# Patient Record
Sex: MALE | Race: WHITE | URBAN - METROPOLITAN AREA
[De-identification: names, ages, dates, MRNs, and addresses within clinical notes are randomized per-mention and may not be internally consistent; named-entity substitution may affect disease eponyms.]

---

## 2017-01-25 ENCOUNTER — TREATMENT (OUTPATIENT)
Dept: PHYSICAL THERAPY | Age: 52
End: 2017-01-25

## 2017-02-17 ENCOUNTER — TREATMENT (OUTPATIENT)
Dept: PHYSICAL THERAPY | Age: 52
End: 2017-02-17

## 2017-03-01 ENCOUNTER — TREATMENT (OUTPATIENT)
Dept: PHYSICAL THERAPY | Age: 52
End: 2017-03-01

## 2017-03-15 ENCOUNTER — TREATMENT (OUTPATIENT)
Dept: PHYSICAL THERAPY | Age: 52
End: 2017-03-15

## 2017-04-12 ENCOUNTER — TREATMENT (OUTPATIENT)
Dept: PHYSICAL THERAPY | Age: 52
End: 2017-04-12

## 2017-04-21 ENCOUNTER — TREATMENT (OUTPATIENT)
Dept: PHYSICAL THERAPY | Age: 52
End: 2017-04-21

## 2017-05-05 ENCOUNTER — TREATMENT (OUTPATIENT)
Dept: PHYSICAL THERAPY | Age: 52
End: 2017-05-05

## 2017-05-24 ENCOUNTER — TREATMENT (OUTPATIENT)
Dept: PHYSICAL THERAPY | Age: 52
End: 2017-05-24

## 2017-07-07 ENCOUNTER — TREATMENT (OUTPATIENT)
Dept: PHYSICAL THERAPY | Age: 52
End: 2017-07-07

## 2017-07-21 ENCOUNTER — TREATMENT (OUTPATIENT)
Dept: PHYSICAL THERAPY | Age: 52
End: 2017-07-21

## 2017-09-08 ENCOUNTER — TREATMENT (OUTPATIENT)
Dept: PHYSICAL THERAPY | Age: 52
End: 2017-09-08

## 2017-09-29 ENCOUNTER — TREATMENT (OUTPATIENT)
Dept: PHYSICAL THERAPY | Age: 52
End: 2017-09-29

## 2017-10-06 ENCOUNTER — TREATMENT (OUTPATIENT)
Dept: PHYSICAL THERAPY | Age: 52
End: 2017-10-06

## 2017-10-06 NOTE — FLOWSHEET NOTE
[address]  Phone: [phone]  Fax [phone]      Date:  10/6/2017    Patient Name:  Henrique Pool    :  1965  MRN: [id number]  Restrictions/Precautions:    Medical/Treatment Diagnosis Information:   Hist of LBP  Pilates general conditioning     Physician Information:       Patient is Post-Op [] Yes   [] No     DOS:           9/8/17 9/29/17 10/6/17       Subjective Doing well, been keeping up with this at home Been doing Pilates 2X a week, my back feels good Doing well                 Weeks Post-Op                    Objective Ab strength improvement  Noted, hamstring flexibility remaining the same, les pain complaints Trunk stab skills improving, spine ext 45 degrees on box, ham flexibility 90 degrees     Ab strength improvement  Noted, hamstring flexibility remaining the same, less pain complaints             Goals Cont Pilates trunk stab for maintenance      Cont Pilates trunk stab for maintenance             Reformer Exercises 2R1G squats, raises w/ str, U sq w/ isidro, walks   2R1G squats, raises w/ str, U sq w/ isidro, walks 2G1R squats, raises w/ str, U sq w/ isidro, walks 2G1R squats, raises w/ str, U sq w/ isidro, walks 2G1R squats, raises w/ str, U sq w/ isidro, walks 2G1R squats, raises w/ str, U sq w/ isidro, walks 2G1R squats, raises w/ str, U sq w/ isidro, walks   Pelvic Stabilization   2R stand ab/ad 20/20 1R1B stand  1R1B stand 1R1B stand 3X10 1R1B stand 3X10 1R1B stand 3X10                                  Trunk Stabilization 2R TA variat  2R 100's, obl 2R TA variat  2R 100's, obl 2R TA variat  2R 100's, obl 2R TA variat  2R 100's, obl 2R TA variat  2R 100's, obl 2R TA variat  2R 100's, obl 2R TA variat  2R 100's, obl              1R1B Plank 1min 15  1 min 45sec Plank 1 min Plank 1min5 sec      2R dbl leg press, plank         Box: HSC 2R  Box: passive ext     2R HSC  Passive ext 2R HSC  1R Passive spine ext w/hip ext 2R HSC  1R Passive spine ext w/hip ext      Hip Disassociation 2R1B arcs: ll, v, cirlce, ir/er, U w/ abd, frog 2R1B arcs: ll, v, Jamul, ir/er, U w/ abd, frog 2R1B arcs: ll, v, [place], ir/er, U w/ abd, frog 2R1B arcs: ll, v, [place], ir/er, U w/ abd, frog 2R1B arcs: ll, v, [place], ir/er, U w/ abd, frog 2R1B arcs: ll, v, [place], ir/er, U w/ abd, frog 1R1B arcs: ll, v, [place], ir/er, U w/ abd, frog              2R stand leg press 2R stand leg press 1 arm 2R stand leg press 1 arm 2R stand leg press 1 arm 2R stand leg press 1 arm 2R stand leg press 1 arm 2R stand leg press 1 arm             Scapular Stabilization Seated: 2R1B low row, 2R biceps, 1R shldr ext                                       Thoracic Mobility 2R1B Bridge w ext 2R1B Bridge w ext 2R Bridge 2R Bridge w/ext 2R Bridge w/ext 2R Bridge w/ext 2R Bridge w/ext                                 General ROM Hams, hip add, pirif, lunge Hams, hip add, pirif, lunge Hams, hip add, pirif, lunge Hams, hip add, pirif, lunge Hams hip add pirif lunge Hams hip add pirif lunge Hams hip add pirif lunge                                 Other                                        Summary/Comments                                           Electronically signed by:  Jhoana Jolly

## 2017-11-03 ENCOUNTER — TREATMENT (OUTPATIENT)
Dept: PHYSICAL THERAPY | Age: 52
End: 2017-11-03

## 2017-11-17 ENCOUNTER — TREATMENT (OUTPATIENT)
Dept: PHYSICAL THERAPY | Age: 52
End: 2017-11-17

## 2017-11-17 NOTE — FLOWSHEET NOTE
11/3/17  [address]  Phone: [phone]  Fax [phone]      Date:  2017    Patient Name:  Dain Cazares    :  1965  MRN: [id number]  Restrictions/Precautions:    Medical/Treatment Diagnosis Information:   Hist of LBP  Pilates general conditioning     Physician Information:       Patient is Post-Op [] Yes   [] No     DOS:           9/8/17 9/29/17 10/6/17 11/3/17 11/17/17     Subjective Doing well, been keeping up with this at home Been doing Pilates 2X a week, my back feels good Doing well Back is feeling good, I have been doing my home exercises almost everyday and Pilates on the [place] wo or three times a week.  Doing well               Weeks Post-Op                    Objective Ab strength improvement  Noted, hamstring flexibility remaining the same, les pain complaints Trunk stab skills improving, spine ext 45 degrees on box, ham flexibility 90 degrees  Trunk stab skills improving,  spine ext 45 degrees on box,   ham flexibility 90 degrees   Ab strength improvement  Noted, hamstring flexibility remaining the same, less pain complaints             Goals Cont Pilates trunk stab for maintenance      Cont Pilates trunk stab for maintenance             Reformer Exercises 2R1G squats, raises w/ str, U sq w/ isidro, walks   2R1G squats, raises w/ str, U sq w/ isidro, walks 2G1R squats, raises w/ str, U sq w/ isidro, walks 2G1R squats, raises w/ str, U sq w/ isidro, walks 2G2R squats, raises w/ str, U sq w/ isidro, walks 2G1R squats, raises w/ str, U sq w/ isidro, walks 2G1R squats, raises w/ str, U sq w/ isidro, walks   Pelvic Stabilization   2R stand ab/ad 20/20 1R1B stand  1R1B stand 1R1B stand 3X10 1R1B stand 3X10 1R1B stand 3X10                                  Trunk Stabilization 2R TA variat  2R 100's, obl 2R TA variat  2R 100's, obl 2R TA variat  2R 100's, obl 2R TA variat  2R 100's, obl 2R TA variat  2R 100's, obl 2R TA variat  2R 100's, obl 2R TA variat  2R 100's, obl              1R1B Plank 1min 15  1 min 45sec Plank 1 min Plank 1min5 sec      2R dbl leg press, plank         Box: HSC 2R  Box: passive ext     2R HSC  Passive ext 2R HSC  1R Passive spine ext w/hip ext 2R HSC  1R Passive spine ext w/hip ext      Hip Disassociation 2R1B arcs: ll, v, cirlce, ir/er, U w/ abd, frog 2R1B arcs: ll, v, [place], ir/er, U w/ abd, frog 2R1B arcs: ll, v, [place], ir/er, U w/ abd, frog 2R1B arcs: ll, v, [place], ir/er, U w/ abd, frog 2R1B arcs: ll, v, [place], ir/er, U w/ abd, frog 2R1B arcs: ll, v, [place], ir/er, U w/ abd, frog 1R1B arcs: ll, v, [place], ir/er, U w/ abd, frog              2R stand leg press 2R stand leg press 1 arm 2R stand leg press 1 arm 2R stand leg press 1 arm 2R stand leg press 1 arm 2R stand leg press 1 arm 2R stand leg press 1 arm             Scapular Stabilization Seated: 2R1B low row, 2R biceps, 1R shldr ext   Seated: 2R1B low row, 2R biceps, 1R shldr ext                                    Thoracic Mobility 2R1B Bridge w ext 2R1B Bridge w ext 2R Bridge 2R Bridge w/ext 2R Bridge w/ext 2R Bridge w/ext 2R Bridge w/ext                                 General ROM Hams, hip add, pirif, lunge Hams, hip add, pirif, lunge Hams, hip add, pirif, lunge Hams, hip add, pirif, lunge Hams hip add pirif lunge Hams hip add pirif lunge Hams hip add pirif lunge                                 Other                                        Summary/Comments                                           Electronically signed by:  Celeste Boeck

## 2018-01-26 ENCOUNTER — TREATMENT (OUTPATIENT)
Dept: PHYSICAL THERAPY | Age: 53
End: 2018-01-26

## 2018-01-26 NOTE — FLOWSHEET NOTE
2R TA variat  2R 100's, obl 2R TA variat  2R 100's, obl 2R TA variat  2R 100's, obl              1R1B Plank 1min 15  1 min 45sec Plank 1 min Plank 1min5 sec      2R dbl leg press, plank         Box: HSC 2R  Box: passive ext     2R HSC  Passive ext 2R HSC  1R Passive spine ext w/hip ext 2R HSC  1R Passive spine ext w/hip ext      Hip Disassociation 2R1B arcs: ll, v, cirlce, ir/er, U w/ abd, frog 2R1B arcs: ll, v, [place], ir/er, U w/ abd, frog 2R1B arcs: ll, v, [place], ir/er, U w/ abd, frog 2R1B arcs: ll, v, [place], ir/er, U w/ abd, frog 2R1B arcs: ll, v, [place], ir/er, U w/ abd, frog 2R1B arcs: ll, v, [place], ir/er, U w/ abd, frog 1R1B arcs: ll, v, [place], ir/er, U w/ abd, frog              2R stand leg press 2R stand leg press 1 arm 2R stand leg press 1 arm 2R stand leg press 1 arm 2R stand leg press 1 arm 2R stand leg press 1 arm 2R stand leg press 1 arm             Scapular Stabilization Seated: 2R1B low row, 2R biceps, 1R shldr ext   Seated: 2R1B low row, 2R biceps, 1R shldr ext                                    Thoracic Mobility 2R1B Bridge w ext 2R1B Bridge w ext 2R Bridge 2R Bridge w/ext 2R Bridge w/ext 2R Bridge w/ext 2R Bridge w/ext                                 General ROM Hams, hip add, pirif, lunge Hams, hip add, pirif, lunge Hams, hip add, pirif, lunge Hams, hip add, pirif, lunge Hams hip add pirif lunge Hams hip add pirif lunge Hams hip add pirif lunge                                 Other                                        Summary/Comments                                           Electronically signed by:  Ana Smith

## 2018-05-10 ENCOUNTER — EVALUATION (OUTPATIENT)
Dept: PHYSICAL THERAPY | Age: 53
End: 2018-05-10

## 2018-05-10 DIAGNOSIS — M54.12 CERVICAL RADICULOPATHY: Primary | ICD-10-CM

## 2018-05-10 PROCEDURE — 97161 PT EVAL LOW COMPLEX 20 MIN: CPT | Performed by: PHYSICAL THERAPIST

## 2018-05-10 PROCEDURE — 97110 THERAPEUTIC EXERCISES: CPT | Performed by: PHYSICAL THERAPIST

## 2018-05-10 PROCEDURE — 97140 MANUAL THERAPY 1/> REGIONS: CPT | Performed by: PHYSICAL THERAPIST

## 2018-05-15 ENCOUNTER — TREATMENT (OUTPATIENT)
Dept: PHYSICAL THERAPY | Age: 53
End: 2018-05-15

## 2018-05-15 DIAGNOSIS — M54.12 CERVICAL RADICULOPATHY: Primary | ICD-10-CM

## 2018-05-15 PROCEDURE — 97110 THERAPEUTIC EXERCISES: CPT | Performed by: PHYSICAL THERAPIST

## 2018-05-15 PROCEDURE — 97140 MANUAL THERAPY 1/> REGIONS: CPT | Performed by: PHYSICAL THERAPIST

## 2018-05-17 ENCOUNTER — TREATMENT (OUTPATIENT)
Dept: PHYSICAL THERAPY | Age: 53
End: 2018-05-17

## 2018-05-17 DIAGNOSIS — M54.12 CERVICAL RADICULOPATHY: Primary | ICD-10-CM

## 2018-05-17 PROCEDURE — 97140 MANUAL THERAPY 1/> REGIONS: CPT | Performed by: PHYSICAL THERAPIST

## 2018-05-17 PROCEDURE — 97110 THERAPEUTIC EXERCISES: CPT | Performed by: PHYSICAL THERAPIST

## 2018-05-22 ENCOUNTER — TREATMENT (OUTPATIENT)
Dept: PHYSICAL THERAPY | Age: 53
End: 2018-05-22

## 2018-05-22 DIAGNOSIS — M54.12 CERVICAL RADICULOPATHY: Primary | ICD-10-CM

## 2018-05-22 PROCEDURE — 97110 THERAPEUTIC EXERCISES: CPT | Performed by: PHYSICAL THERAPIST

## 2018-05-22 PROCEDURE — 97140 MANUAL THERAPY 1/> REGIONS: CPT | Performed by: PHYSICAL THERAPIST

## 2018-05-29 ENCOUNTER — TREATMENT (OUTPATIENT)
Dept: PHYSICAL THERAPY | Age: 53
End: 2018-05-29

## 2018-05-29 DIAGNOSIS — M54.12 CERVICAL RADICULOPATHY: Primary | ICD-10-CM

## 2018-05-29 PROCEDURE — 97110 THERAPEUTIC EXERCISES: CPT | Performed by: PHYSICAL THERAPIST

## 2018-05-29 PROCEDURE — 97140 MANUAL THERAPY 1/> REGIONS: CPT | Performed by: PHYSICAL THERAPIST

## 2018-06-01 ENCOUNTER — HOSPITAL ENCOUNTER (OUTPATIENT)
Dept: PHYSICAL THERAPY | Age: 53
Discharge: HOME | End: 2018-06-30
Attending: ORTHOPAEDIC SURGERY | Admitting: ORTHOPAEDIC SURGERY

## 2018-06-01 NOTE — FLOWSHEET NOTE
driving/computer work  [] (07260) Reviewed/Progressed HEP activities related to improving balance, coordination, kinesthetic sense, posture, motor skill, proprioception of cervical, scapular, scapulothoracic and UE control with self care, reaching, carrying, lifting, house/yardwork, driving/computer work      Manual Treatments:  PROM / STM / Oscillations-Mobs:  G-I, II, III, IV (PA's, Inf., Post.)  [x] (12522) Provided manual therapy to mobilize soft tissue/joints of cervical/CT, scapular GHJ and UE for the purpose of decreasing headache, modulating pain, promoting relaxation,  increasing ROM, reducing/eliminating soft tissue swelling/inflammation/restriction, improving soft tissue extensibility and allowing for proper ROM for normal function with self care, reaching, carrying, lifting, house/yardwork, driving/computer work    Modalities: Mechanical traction x12' 45\"/15\" 20#/14#;  MHP x10' cervical spine    Charges:  Timed Code Treatment Minutes: 35   Total Treatment Minutes: 57     [] EVAL (LOW) 46473 (typically 20 minutes face-to-face)  [] EVAL (MOD) 35361 (typically 30 minutes face-to-face)  [] EVAL (HIGH) 16394 (typically 45 minutes face-to-face)  [] RE-EVAL     [x] PD(72181) x  1   [] IONTO  [] NMR (67075) x      [] VASO   [x] Manual (40437) x  1    [] Other:  [] TA x       [x] Mech Traction (78888)  [] ES(attended) (32450)      [] ES (un) (33514):     GOALS:  Short Term Goals: To be achieved in: 2 weeks  1. Independent in HEP and progression per patient tolerance, in order to prevent re-injury. 2. Patient will have a decrease in pain to facilitate improvement in movement, function, and ADLs as indicated by Functional Deficits.     Long Term Goals: To be achieved in: 8 weeks  1. Disability index score of 10% or less for the NDI to assist with reaching prior level of function.    2. Patient will demonstrate increased AROM to [facility] of cervical/thoracic spine to allow for proper joint functioning as indicated by patients Functional Deficits. 3. Patient will demonstrate an increase in postural awareness and control and activation of  Deep cervical stabilizers to allow for proper functional mobility as indicated by patients Functional Deficits. 4. Patient will return to Pilates and cycling/functional activities without increased symptoms or restriction. 5. Pt will no longer report radiculopathy in R UE during work(patient specific functional goal)       Progression Towards Functional goals:  [x] Patient is progressing as expected towards functional goals listed. [] Progression is slowed due to complexities listed. [] Progression has been slowed due to co-morbidities. [] Plan just implemented, too soon to assess goals progression  [] Other:     ASSESSMENT:  Initiated [facility] traction today with positive effects - reduced N/T R UE. Discussed activity modification during upcoming travel/hiking. Also discussed possible use of home traction unit. Improved awareness of posture noted throughout treatment.     Treatment/Activity Tolerance:  [x] Patient tolerated treatment well [] Patient limited by fatique  [] Patient limited by pain  [] Patient limited by other medical complications  [] Other:     Prognosis: [x] Good [] Fair  [] Poor    Patient Requires Follow-up: [x] Yes  [] No    PLAN: See eval  [x] Continue per plan of care [] Alter current plan (see comments)  [] Plan of care initiated [] Hold pending MD visit [] Discharge    Electronically signed by: Mery Goldstein, [address] [facility], DPT [id number]

## 2018-07-01 ENCOUNTER — HOSPITAL ENCOUNTER (OUTPATIENT)
Dept: PHYSICAL THERAPY | Age: 53
Discharge: HOME | End: 2018-07-01
Attending: ORTHOPAEDIC SURGERY | Admitting: ORTHOPAEDIC SURGERY

## 2018-07-16 ENCOUNTER — TREATMENT (OUTPATIENT)
Dept: PHYSICAL THERAPY | Age: 53
End: 2018-07-16

## 2018-07-16 DIAGNOSIS — M54.12 CERVICAL RADICULOPATHY: Primary | ICD-10-CM

## 2018-07-16 PROCEDURE — 97110 THERAPEUTIC EXERCISES: CPT | Performed by: PHYSICAL THERAPIST

## 2018-07-16 PROCEDURE — 97140 MANUAL THERAPY 1/> REGIONS: CPT | Performed by: PHYSICAL THERAPIST

## 2018-07-16 NOTE — FLOWSHEET NOTE
[facility] [place]      Physical Therapy Daily Treatment Note  Date:  2018    Patient Name:  Margie Granado    :  1965  MRN: [id number]  Restrictions/Precautions:    Medical/Treatment Diagnosis Information:  Diagnosis: Cervicalgia M54.2  Treatment Diagnosis: Cervicalgia M54.2; Cervical radiculopathy Y65.41  Insurance/Certification information:  PT Insurance Information: BCBS  Physician Information:  Referring Practitioner: Marc Duverney, MD  Plan of care signed (Y/N):     Date of Patient follow up with Physician:     G-Code (if applicable):      Date G-Code Applied:    PT G-Codes  Functional Assessment Tool Used: NDI  Score: 20%  Functional Limitation: Carrying, moving and handling objects  Carrying, Moving and Handling Objects Current Status (): At least 20 percent but less than 40 percent impaired, limited or restricted  Carrying, Moving and Handling Objects Goal Status (): At least 1 percent but less than 20 percent impaired, limited or restricted    Progress Note: [x]  Yes  []  No  Next due by: Visit #10      Latex Allergy:  [x]NO      []YES  Preferred Language for Healthcare:   [x]English       []other:    Visit # Insurance Allowable Requires auth   7 30    []no        []yes:     Pain level:  NT/10     SUBJECTIVE: Feeling a bit better. Pt reports that he doesn't get N/T in R UE as often but still daily. He has been doing HEP once/day. Just got home traction unit and he has questions about how to use it.     OBJECTIVE:  Observation: pt ed: use of home Bass traction unit x10'; recommended he begin with 10' treatment once or twice/day  Reviewed ulnar nerve glides and HEP with black TB given      Test measurements:  NT    RESTRICTIONS/PRECAUTIONS: R UE radic    Exercises/Interventions:   Therapeutic Ex Sets/sec Reps Notes   Chin tuck  hep   scap squeeze  hep   UT S  hep   LS S  hep   [place] Pec S 5x20\"  hep   No money - Green TB 2x10x5\"  Added to hep Deficits. 3. Patient will demonstrate an increase in postural awareness and control and activation of  Deep cervical stabilizers to allow for proper functional mobility as indicated by patients Functional Deficits. 4. Patient will return to Pilates and cycling/functional activities without increased symptoms or restriction. 5. Pt will no longer report radiculopathy in R UE during work(patient specific functional goal)       Progression Towards Functional goals:  [x] Patient is progressing as expected towards functional goals listed. [] Progression is slowed due to complexities listed. [] Progression has been slowed due to co-morbidities. [] Plan just implemented, too soon to assess goals progression  [] Other:     ASSESSMENT:  Educated pt on use of home traction unit and reviewed HEP, progressed TB resistance. Recommended pt cont to perform HEP daily, add in daily home traction and f/u prn as needed. Tightness in R cervical/thoracic musculature noted compared to L.     Treatment/Activity Tolerance:  [x] Patient tolerated treatment well [] Patient limited by fatique  [] Patient limited by pain  [] Patient limited by other medical complications  [] Other:     Prognosis: [x] Good [] Fair  [] Poor    Patient Requires Follow-up: [x] Yes  [] No    PLAN: See eval  [] Continue per plan of care [x] Alter current plan (see comments)  [] Plan of care initiated [] Hold pending MD visit [] Discharge    Electronically signed by: Annalise Peraza, [address] [facility], DPT [id number]

## 2018-11-14 ENCOUNTER — TREATMENT (OUTPATIENT)
Dept: PHYSICAL THERAPY | Age: 53
End: 2018-11-14

## 2018-11-14 NOTE — FLOWSHEET NOTE
11/3/17  [address]  Phone: [phone]  Fax [phone]      Date:  2018    Patient Name:  Josue Carvajal    :  1965  MRN: [id number]  Restrictions/Precautions:    Medical/Treatment Diagnosis Information:   Hist of LBP  Pilates general conditioning     Physician Information:       Patient is Post-Op [] Yes   [] No     DOS:           9/8/17 9/29/17 10/6/17 11/3/17 11/17/17 1/26/18 11/14/18   Subjective Doing well, been keeping up with this at home Been doing Pilates 2X a week, my back feels good Doing well Back is feeling good, I have been doing my home exercises almost everyday and Pilates on the [place] wo or three times a week.  Doing well Feel good been doing Pilates at home I'm back from having a disc problem in my neck and feeling much better             Weeks Post-Op                    Objective Ab strength improvement  Noted, hamstring flexibility remaining the same, les pain complaints Trunk stab skills improving, spine ext 45 degrees on box, ham flexibility 90 degrees  Trunk stab skills improving,  spine ext 45 degrees on box,   ham flexibility 90 degrees  Hamstrings -4 No pain  Good shoulder ROM  Hams 90 Degrees B             Goals Cont Pilates trunk stab for maintenance      Cont Pilates trunk stab for maintenance             Reformer Exercises 2R1G squats, raises w/ str, U sq w/ isidro, walks   2R1G squats, raises w/ str, U sq w/ isidro, walks 2G1R squats, raises w/ str, U sq w/ isidro, walks 2G1R squats, raises w/ str, U sq w/ isidro, walks 2G2R squats, raises w/ str, U sq w/ isidro, walks 2G1R squats, raises w/ str, U sq w/ isidro, walks 2G1R squats, raises w/ str, U sq w/ isidro, walks   Pelvic Stabilization   2R stand ab/ad 20/20 1R1B stand  1R1B stand 1R1B stand 3X10 1R1B stand 3X10 1R1B stand 3X10 1R1B stand duke                                 Trunk Stabilization 2R TA variat  2R 100's, obl 2R TA variat  2R 100's, obl 2R TA

## 2018-12-12 ENCOUNTER — TREATMENT (OUTPATIENT)
Dept: PHYSICAL THERAPY | Age: 53
End: 2018-12-12

## 2018-12-12 NOTE — FLOWSHEET NOTE
11/3/17  [address]  Phone: [phone]  Fax [phone]      Date:  2018    Patient Name:  Yolanda Rosenberg    :  1965  MRN: [id number]  Restrictions/Precautions:    Medical/Treatment Diagnosis Information:   Hist of LBP  Pilates general conditioning     Physician Information:       Patient is Post-Op [] Yes   [] No     DOS:           18         Subjective Doing really well, been doing Reformer exercises twice a week doing home exercises 2-3 days a week                   Weeks Post-Op                    Objective Ab strength improvement  Noted, hamstring flexibility remaining the same, less pain complaints Trunk stab skills improving, spine ext 45 degrees on box, ham flexibility 90 degrees  Trunk stab skills improving,  spine ext 45 degrees on box,   ham flexibility 90 degrees  Hamstrings -4 No pain  Good shoulder ROM  Hams 90 Degrees B             Goals Cont Pilates trunk stab for maintenance  Posture  Neck strength      Cont Pilates trunk stab for maintenance             Reformer Exercises 2R1G squats, raises w/ str, U sq w/ isidro, walks   2R1G squats, raises w/ str, U sq w/ isidro, walks 2G1R squats, raises w/ str, U sq w/ isidro, walks 2G1R squats, raises w/ str, U sq w/ isidro, walks 2G2R squats, raises w/ str, U sq w/ isidro, walks 2G1R squats, raises w/ str, U sq w/ isidro, walks 2G1R squats, raises w/ str, U sq w/ isidro, walks   Pelvic Stabilization   2R stand ab/ad 20/20 1R1B stand  1R1B stand 1R1B stand 3X10 1R1B stand 3X10 1R1B stand 3X10 1R1B stand duke                                 Trunk Stabilization 2R TA variat  2R 100's, obl 2R TA variat  2R 100's, obl 2R TA variat  2R 100's, obl 2R TA variat  2R 100's, obl 2R TA variat  2R 100's, obl 2R TA variat  2R 100's, obl 1R1B TA variat  1R1B modified 100's, obl              1R1B Plank 1min 15  1 min 45sec Plank 1 min Plank 1min5 sec      2R dbl leg press, plank         Box: [address]

## 2018-12-21 ENCOUNTER — TREATMENT (OUTPATIENT)
Dept: PHYSICAL THERAPY | Age: 53
End: 2018-12-21

## 2018-12-21 NOTE — FLOWSHEET NOTE
1min5 sec      2R dbl leg press, plank         Box: HSC 2R  Box: passive ext     2R HSC  Passive ext 2R HSC  1R Passive spine ext w/hip ext 1R1B HSC  1R Passive spine ext w/hip ext  1R prone thor ext w/ scap squeeze      Hip Disassociation 2R1B arcs: ll, v, cirlce, ir/er, U w/ abd, frog 2R1B arcs: ll, v, [place], ir/er, U w/ abd, frog 2R1B arcs: ll, v, [place], ir/er, U w/ abd, frog 2R1B arcs: ll, v, [place], ir/er, U w/ abd, frog 2R1B arcs: ll, v, [place], ir/er, U w/ abd, frog 2R1B arcs: ll, v, [place], ir/er, U w/ abd, frog 2R1b arcs: ll, v, [place], ir/er, U w/ abd, frog              2R stand leg press 2R stand leg press 1 arm 2R stand leg press 1 arm 2R stand leg press 1 arm 2R stand leg press 1 arm 2R stand leg press 1 arm              Scapular Stabilization Seated: 2R1B low row, 2R biceps, 1R shldr ext   Seated: 2R1B low row, 2R biceps, 1R shldr ext   Seated on box: low row, shldr ext              Box: prone- chin tuck, scap squeeze                   Thoracic Mobility 2R1B Bridge w ext 2R1B Bridge w ext 2R Bridge 2R Bridge w/ext 2R Bridge w/ext 2R Bridge w/ext 2R Bridge w/ext 2:1                                 General ROM Hams, hip add, pirif, lunge Hams, hip add, pirif, lunge Hams, hip add, pirif, lunge Hams, hip add, pirif, lunge Hams hip add pirif lunge Hams hip add pirif lunge Hams hip add pirif lunge                                 Other                                        Summary/Comments                                           Electronically signed by:  Justin Perez

## 2019-02-13 ENCOUNTER — TREATMENT (OUTPATIENT)
Dept: PHYSICAL THERAPY | Age: 54
End: 2019-02-13

## 2019-04-19 ENCOUNTER — TREATMENT (OUTPATIENT)
Dept: PHYSICAL THERAPY | Age: 54
End: 2019-04-19

## 2019-04-19 PROCEDURE — MISCPILATES PILATES CLASS

## 2019-04-19 NOTE — FLOWSHEET NOTE
11/3/17  [address]  Phone: [phone]  Fax [phone]      Date:  2019    Patient Name:  Bevely Dancer    :  1965  MRN: [id number]  Restrictions/Precautions:    Medical/Treatment Diagnosis Information:   Hist of LBP  Pilates general conditioning     Physician Information:       Patient is Post-Op [] Yes   [] No     DOS:           18      Subjective Doing really well, been doing Reformer exercises twice a week doing home exercises 2-3 days a week Feeling good, my neck and back have been doing well Took a yoga class and my back and hamstrings were pretty sore, I will avoid so much forward flexion next time Feeling real good, just want you to look at my routine         [address] 19      Weeks Post-Op                    Objective Ab strength improvement  Noted, hamstring flexibility remaining the same, less pain complaints Trunk stab skills improving, spine ext 45 degrees on box, ham flexibility 90 degrees No pain  Good shoulder ROM  Hams 90 Degrees B Trunk stab skills improving,  spine ext 45 degrees on box,   ham flexibility 90 degrees  Hamstrings -4 No pain  Good shoulder ROM  Hams 90 Degrees B             Goals Cont Pilates trunk stab for maintenance  Posture  Neck strength      Cont Pilates trunk stab for maintenance             Reformer Exercises 2R1G squats, raises w/ str, U sq w/ isidro, walks   2R1G squats, raises w/ str, U sq w/ isidro, walks 2G1R squats, raises w/ str, U sq w/ isidro, walks 2G1R squats, raises w/ str, U sq w/ isidro, walks 2G2R squats, raises w/ str, U sq w/ isidro, walks 2G1R squats, raises w/ str, U sq w/ isidro, walks 2G1R squats, raises w/ str, U sq w/ isidro, walks   Pelvic Stabilization   2R stand ab/ad 20/20 1R1B stand  2R stand 1R1B stand 3X10 1R1B stand 3X10 1R1B stand 3X10 1R1B stand duke                                 Trunk Stabilization 2R TA variat  2R 100's, obl 2R TA variat  2R 100's, obl 2R TA variat  2R 100's, obl 2R TA variat  2R 100's, obl 2R TA variat  2R 100's, obl 2R TA variat  2R 100's, obl 1R1B TA variat  1R1B modified 100's, obl              1R1B Plank 1min 15  1 min 1 min Plank 1 min Plank 1min5 sec      2R dbl leg press, plank    Box: HSC 2R  Box: passive ext     Box: HSC 2R  Box: passive ext     2R HSC  Passive ext 2R HSC  1R Passive spine ext w/hip ext 1R1B HSC  1R Passive spine ext w/hip ext  1R prone thor ext w/ scap squeeze      Hip Disassociation 2R1B arcs: ll, v, cirlce, ir/er, U w/ abd, frog 2R1B arcs: ll, v, [place], ir/er, U w/ abd, frog 2R1B arcs: ll, v, [place], ir/er, U w/ abd, frog 2R1B arcs: ll, v, [place], ir/er, U w/ abd, frog 2R1B arcs: ll, v, [place], ir/er, U w/ abd, frog 2R1B arcs: ll, v, [place], ir/er, U w/ abd, frog 2R1b arcs: ll, v, [place], ir/er, U w/ abd, frog              2R stand leg press 2R stand leg press 1 arm  2R stand leg press 1 arm 2R stand leg press 1 arm 2R stand leg press 1 arm              Scapular Stabilization Seated: 2R1B low row, 2R biceps, 1R shldr ext  Seated: low row 2R  shldr ext 1R  Biceps 1R1B Seated: 2R1B low row, 2R biceps, 1R shldr ext   Seated on box: low row, shldr ext              Box: prone- chin tuck, scap squeeze                   Thoracic Mobility 2R1B Bridge w ext 2R1B Bridge w ext 2R Bridge w/ext 2R Bridge w/ext 2R Bridge w/ext 2R Bridge w/ext 2R Bridge w/ext 2:1                                 General ROM Hams, hip add, pirif, lunge Hams, hip add, pirif, lunge Hams, hip add, pirif, lunge Hams, hip add, pirif, lunge Hams hip add pirif lunge Hams hip add pirif lunge Hams hip add pirif lunge                                 Other                                        Summary/Comments                                           Electronically signed by:  Delores Pleitez

## 2019-04-19 NOTE — FLOWSHEET NOTE
11/3/17  [address]  Phone: [phone]  Fax [phone]      Date:  2019    Patient Name:  Hien Rodriguez    :  1965  MRN: [id number]  Restrictions/Precautions:    Medical/Treatment Diagnosis Information:   Hist of LBP  Pilates general conditioning     Physician Information:       Patient is Post-Op [] Yes   [] No     DOS:           18      Subjective Doing really well, been doing Reformer exercises twice a week doing home exercises 2-3 days a week Feeling good, my neck and back have been doing well Took a yoga class and my back and hamstrings were pretty sore, I will avoid so much forward flexion next time Feeling real good, just want you to look at my routine                Weeks Post-Op                    Objective Ab strength improvement  Noted, hamstring flexibility remaining the same, less pain complaints Trunk stab skills improving, spine ext 45 degrees on box, ham flexibility 90 degrees No pain  Good shoulder ROM  Hams 90 Degrees B Trunk stab skills improving,  spine ext 45 degrees on box,   ham flexibility 90 degrees  Hamstrings -4 No pain  Good shoulder ROM  Hams 90 Degrees B             Goals Cont Pilates trunk stab for maintenance  Posture  Neck strength      Cont Pilates trunk stab for maintenance             Reformer Exercises 2R1G squats, raises w/ str, U sq w/ isidro, walks   2R1G squats, raises w/ str, U sq w/ isidro, walks 2G1R squats, raises w/ str, U sq w/ isidro, walks 2G1R squats, raises w/ str, U sq w/ isidro, walks 2G2R squats, raises w/ str, U sq w/ isidro, walks 2G1R squats, raises w/ str, U sq w/ isidro, walks 2G1R squats, raises w/ str, U sq w/ isidro, walks   Pelvic Stabilization   2R stand ab/ad 20/20 1R1B stand  2R stand 1R1B stand 3X10 1R1B stand 3X10 1R1B stand 3X10 1R1B stand duke                                 Trunk Stabilization 2R TA variat  2R 100's, obl 2R TA variat  2R 100's, obl 2R TA variat  2R 100's, obl 2R TA variat  2R 100's, obl 2R TA variat  2R 100's, obl 2R TA variat  2R 100's, obl 1R1B TA variat  1R1B modified 100's, obl              1R1B Plank 1min 15  1 min 1 min Plank 1 min Plank 1min5 sec      2R dbl leg press, plank    Box: HSC 2R  Box: passive ext     Box: HSC 2R  Box: passive ext     2R HSC  Passive ext 2R HSC  1R Passive spine ext w/hip ext 1R1B HSC  1R Passive spine ext w/hip ext  1R prone thor ext w/ scap squeeze      Hip Disassociation 2R1B arcs: ll, v, cirlce, ir/er, U w/ abd, frog 2R1B arcs: ll, v, [place], ir/er, U w/ abd, frog 2R1B arcs: ll, v, [place], ir/er, U w/ abd, frog 2R1B arcs: ll, v, [place], ir/er, U w/ abd, frog 2R1B arcs: ll, v, [place], ir/er, U w/ abd, frog 2R1B arcs: ll, v, [place], ir/er, U w/ abd, frog 2R1b arcs: ll, v, [place], ir/er, U w/ abd, frog              2R stand leg press 2R stand leg press 1 arm  2R stand leg press 1 arm 2R stand leg press 1 arm 2R stand leg press 1 arm              Scapular Stabilization Seated: 2R1B low row, 2R biceps, 1R shldr ext  Seated: low row 2R  shldr ext 1R  Biceps 1R1B Seated: 2R1B low row, 2R biceps, 1R shldr ext   Seated on box: low row, shldr ext              Box: prone- chin tuck, scap squeeze                   Thoracic Mobility 2R1B Bridge w ext 2R1B Bridge w ext 2R Bridge w/ext 2R Bridge w/ext 2R Bridge w/ext 2R Bridge w/ext 2R Bridge w/ext 2:1                                 General ROM Hams, hip add, pirif, lunge Hams, hip add, pirif, lunge Hams, hip add, pirif, lunge Hams, hip add, pirif, lunge Hams hip add pirif lunge Hams hip add pirif lunge Hams hip add pirif lunge                                 Other                                        Summary/Comments                                           Electronically signed by:  Javier Benton

## 2019-08-02 ENCOUNTER — TREATMENT (OUTPATIENT)
Dept: PHYSICAL THERAPY | Age: 54
End: 2019-08-02

## 2019-08-02 PROCEDURE — MISCPILATES PILATES CLASS

## 2019-08-02 NOTE — FLOWSHEET NOTE
11/3/17  [address]  Phone: [phone]  Fax [phone]      Date:  2019    Patient Name:  Marsha Vargas    :  1965  MRN: [id number]  Restrictions/Precautions:    Medical/Treatment Diagnosis Information:   Hist of LBP  Pilates general conditioning     Physician Information:       Patient is Post-Op [] Yes   [] No     DOS:           18     Subjective Doing really well, been doing Reformer exercises twice a week doing home exercises 2-3 days a week Feeling good, my neck and back have been doing well Took a yoga class and my back and hamstrings were pretty sore, I will avoid so much forward flexion next time Feeling real good, just want you to look at my routine My neck has been a little sore but I have been working a lot         P[address] 210 19 2      Weeks Post-Op                    Objective Ab strength improvement  Noted, hamstring flexibility remaining the same, less pain complaints Trunk stab skills improving, spine ext 45 degrees on box, ham flexibility 90 degrees No pain  Good shoulder ROM  Hams 90 Degrees B Trunk stab skills improving,  spine ext 45 degrees on box,   ham flexibility 90 degrees  Hamstrings -4 No pain  Good shoulder ROM  Hams 90 Degrees B             Goals Cont Pilates trunk stab for maintenance  Posture  Neck strength      Cont Pilates trunk stab for maintenance             Reformer Exercises 2R1G squats, raises w/ str, U sq w/ isidro, walks   2R1G squats, raises w/ str, U sq w/ isidro, walks 2G1R squats, raises w/ str, U sq w/ isidro, walks 2G1R squats, raises w/ str, U sq w/ isidro, walks 2G2R squats, raises w/ str, U sq w/ isidro, walks 2G1R squats, raises w/ str, U sq w/ isidro, walks 2G1R squats, raises w/ str, U sq w/ isidro, walks   Pelvic Stabilization   2R stand ab/ad / 1R1B stand  2R stand 1R1B stand 3X10 1R1B stand 3X10 1R1B stand 3X10 1R1B stand duke

## 2019-10-23 ENCOUNTER — TREATMENT (OUTPATIENT)
Dept: PHYSICAL THERAPY | Age: 54
End: 2019-10-23

## 2019-10-23 PROCEDURE — MISCPILATES PILATES CLASS

## 2019-12-06 ENCOUNTER — OFFICE VISIT (OUTPATIENT)
Dept: ORTHOPEDIC SURGERY | Age: 54
End: 2019-12-06
Payer: COMMERCIAL

## 2019-12-06 VITALS — BODY MASS INDEX: 29.4 KG/M2 | WEIGHT: 210 LBS | HEIGHT: 71 IN

## 2019-12-06 DIAGNOSIS — M22.42 CHONDROMALACIA OF LEFT PATELLOFEMORAL JOINT: ICD-10-CM

## 2019-12-06 DIAGNOSIS — M25.562 LEFT KNEE PAIN, UNSPECIFIED CHRONICITY: Primary | ICD-10-CM

## 2019-12-06 PROCEDURE — 99243 OFF/OP CNSLTJ NEW/EST LOW 30: CPT | Performed by: ORTHOPAEDIC SURGERY

## 2019-12-10 ENCOUNTER — EVALUATION (OUTPATIENT)
Dept: PHYSICAL THERAPY | Age: 54
End: 2019-12-10
Payer: COMMERCIAL

## 2019-12-10 DIAGNOSIS — M25.562 ACUTE PAIN OF LEFT KNEE: Primary | ICD-10-CM

## 2019-12-10 PROCEDURE — 97110 THERAPEUTIC EXERCISES: CPT | Performed by: PHYSICAL THERAPIST

## 2019-12-10 PROCEDURE — 97161 PT EVAL LOW COMPLEX 20 MIN: CPT | Performed by: PHYSICAL THERAPIST

## 2019-12-13 ENCOUNTER — TREATMENT (OUTPATIENT)
Dept: PHYSICAL THERAPY | Age: 54
End: 2019-12-13

## 2019-12-13 PROCEDURE — MISCPILATES PILATES CLASS

## 2019-12-17 ENCOUNTER — TREATMENT (OUTPATIENT)
Dept: PHYSICAL THERAPY | Age: 54
End: 2019-12-17
Payer: COMMERCIAL

## 2019-12-17 DIAGNOSIS — M25.562 ACUTE PAIN OF LEFT KNEE: Primary | ICD-10-CM

## 2019-12-17 PROCEDURE — 97110 THERAPEUTIC EXERCISES: CPT | Performed by: PHYSICAL THERAPIST

## 2019-12-17 PROCEDURE — 97112 NEUROMUSCULAR REEDUCATION: CPT | Performed by: PHYSICAL THERAPIST

## 2019-12-17 PROCEDURE — 97140 MANUAL THERAPY 1/> REGIONS: CPT | Performed by: PHYSICAL THERAPIST

## 2019-12-23 ENCOUNTER — TREATMENT (OUTPATIENT)
Dept: PHYSICAL THERAPY | Age: 54
End: 2019-12-23
Payer: COMMERCIAL

## 2019-12-23 DIAGNOSIS — M25.562 ACUTE PAIN OF LEFT KNEE: Primary | ICD-10-CM

## 2019-12-23 PROCEDURE — 97110 THERAPEUTIC EXERCISES: CPT | Performed by: PHYSICAL THERAPIST

## 2019-12-23 PROCEDURE — 97112 NEUROMUSCULAR REEDUCATION: CPT | Performed by: PHYSICAL THERAPIST

## 2019-12-31 ENCOUNTER — TREATMENT (OUTPATIENT)
Dept: PHYSICAL THERAPY | Age: 54
End: 2019-12-31
Payer: COMMERCIAL

## 2019-12-31 DIAGNOSIS — M25.562 ACUTE PAIN OF LEFT KNEE: Primary | ICD-10-CM

## 2019-12-31 PROCEDURE — 97110 THERAPEUTIC EXERCISES: CPT | Performed by: PHYSICAL THERAPIST

## 2019-12-31 PROCEDURE — 97112 NEUROMUSCULAR REEDUCATION: CPT | Performed by: PHYSICAL THERAPIST

## 2020-01-09 ENCOUNTER — TREATMENT (OUTPATIENT)
Dept: PHYSICAL THERAPY | Age: 55
End: 2020-01-09
Payer: COMMERCIAL

## 2020-01-09 PROCEDURE — 97110 THERAPEUTIC EXERCISES: CPT | Performed by: PHYSICAL THERAPIST

## 2020-01-09 PROCEDURE — 97140 MANUAL THERAPY 1/> REGIONS: CPT | Performed by: PHYSICAL THERAPIST

## 2020-01-09 PROCEDURE — 97112 NEUROMUSCULAR REEDUCATION: CPT | Performed by: PHYSICAL THERAPIST

## 2020-01-09 NOTE — FLOWSHEET NOTE
Patient will have a decrease in pain to facilitate improvement in movement, function, and ADLs as indicated by Functional Deficits. []? Progressing: []? Met: []? Not Met: []? Adjusted     Long Term Goals: To be achieved in: 8 weeks  1. Disability index score of 3% or less for the LEFS to assist with reaching prior level of function. []? Progressing: []? Met: []? Not Met: []? Adjusted  2. Patient will demonstrate an increase in Strength to B hip/LE 5/5 to allow for proper functional mobility as indicated by patients Functional Deficits   []? Progressing: []? Met: []? Not Met: []? Adjusted  3. Patient will return to negotiating stairs with reciprocal pattern without increased symptoms or restriction. []? Progressing: []? Met: []? Not Met: []? Adjusted  4. Patient will return to workout routine at gym, including elliptical for >20 min without pain. (patient specific functional goal)    []? Progressing: []? Met: []? Not Met: []? Adjusted         Overall Progression Towards Functional goals/ Treatment Progress Update:  [] Patient is progressing as expected towards functional goals listed. [] Progression is slowed due to complexities/Impairments listed. [] Progression has been slowed due to co-morbidities.   [x] Plan just implemented, too soon to assess goals progression <30days   [] Goals require adjustment due to lack of progress  [] Patient is not progressing as expected and requires additional follow up with physician  [] Other    Prognosis for POC: [x] Good [] Fair  [] Poor      Patient requires continued skilled intervention: [x] Yes  [] No    Treatment/Activity Tolerance:  [x] Patient able to complete treatment  [] Patient limited by fatigue  [] Patient limited by pain    [] Patient limited by other medical complications  [] Other:         Return to Play: (if applicable)   []  Stage 1: Intro to Strength   []  Stage 2: Return to Run and Strength   []  Stage 3: Return to Jump and Strength   []  Stage 4: Dynamic Strength and Agility   []  Stage 5: Sport Specific Training     []  Ready to Return to Play, Meets All Above Stages   []  Not Ready for Return to Sports   Comments:                               PLAN: See eval  [x] Continue per plan of care [] Alter current plan (see comments above)  [] Plan of care initiated [] Hold pending MD visit [] Discharge      Electronically signed by:  Phyllis Sosa PT, DPT [id number]    Note: If patient does not return for scheduled/ recommended follow up visits, this note will serve as a discharge from care along with most recent update on progress.

## 2020-01-21 ENCOUNTER — TREATMENT (OUTPATIENT)
Dept: PHYSICAL THERAPY | Age: 55
End: 2020-01-21
Payer: COMMERCIAL

## 2020-01-21 PROCEDURE — 97110 THERAPEUTIC EXERCISES: CPT | Performed by: PHYSICAL THERAPIST

## 2020-01-21 PROCEDURE — 97112 NEUROMUSCULAR REEDUCATION: CPT | Performed by: PHYSICAL THERAPIST

## 2020-01-21 PROCEDURE — 97140 MANUAL THERAPY 1/> REGIONS: CPT | Performed by: PHYSICAL THERAPIST

## 2020-01-23 NOTE — FLOWSHEET NOTE
[address] [facility] [place]      Physical Therapy Treatment Note/ Progress Report:           Date:  2020    Patient Name:  Robert Rubalcava    :  1965  MRN: [id number]  Restrictions/Precautions:    Medical/Treatment Diagnosis Information:  · Diagnosis: L CMP M22.42  · Treatment Diagnosis: L knee pain W63.085  Insurance/Certification information:  PT Insurance Information: BCBS  Physician Information:  Referring Practitioner: Cl Johnson MD  Has the plan of care been signed (Y/N):        []  Yes  [x]  No     Date of Patient follow up with Physician:       Is this a Progress Report:     [x]  Yes  []  No        If Yes:  Date Range for reporting period:  Beginning 12/10/19  Ending    Progress report will be due (10 Rx or 30 days whichever is less):        Recertification will be due (POC Duration  / 90 days whichever is less):          Visit # Insurance Allowable Auth Required   6  2 in 2020/year []  Yes []  No        Functional Scale: LEFS 4%    Date assessed:  20      Latex Allergy:  [x]NO      []YES  Preferred Language for Healthcare:   [x]English       []other:      Pain level:  1/10     SUBJECTIVE:  Pt reports that his knee feels pretty good. He was able to do elliptical for 20' over the weekend, including 5' backward. Stairs are getting easier. He feels comfortable continuing HEP on his own for a bit.     OBJECTIVE:    Observation: continued tightness noted distal HS with STM; reviewed/progressed HEP to include BOSU lat up & over, BOSU squats      Test measurements:  MMT B knee flex, ext and hip ABD 5/5    RESTRICTIONS/PRECAUTIONS:     Exercises/Interventions:       Therapeutic Ex (60040) Sets/sec Reps Notes/CUES   HS S     Gastroc S     Prone quad S     SLR flex, VMO     SL hip ABD     SAQ with ADD     Bridge with ADD     Prone HSC           IT Band roll out            Reformer Walking 2R1G x20  Squat II, V 2R1G x30 ea  U squat 2R1B 2x10 R/L  Bridge Not Met: []? Adjusted     Long Term Goals: To be achieved in: 8 weeks  1. Disability index score of 3% or less for the LEFS to assist with reaching prior level of function. [x]? Progressing: []? Met: []? Not Met: []? Adjusted  2. Patient will demonstrate an increase in Strength to B hip/LE 5/5 to allow for proper functional mobility as indicated by patients Functional Deficits   []? Progressing: [x]? Met: []? Not Met: []? Adjusted  3. Patient will return to negotiating stairs with reciprocal pattern without increased symptoms or restriction. []? Progressing: [x]? Met: []? Not Met: []? Adjusted  4. Patient will return to workout routine at gym, including elliptical for >20 min without pain. (patient specific functional goal)    []? Progressing: [x]? Met: []? Not Met: []? Adjusted         Overall Progression Towards Functional goals/ Treatment Progress Update:  [x] Patient is progressing as expected towards functional goals listed. [] Progression is slowed due to complexities/Impairments listed. [] Progression has been slowed due to co-morbidities.   [] Plan just implemented, too soon to assess goals progression <30days   [] Goals require adjustment due to lack of progress  [] Patient is not progressing as expected and requires additional follow up with physician  [] Other    Prognosis for POC: [x] Good [] Fair  [] Poor      Patient requires continued skilled intervention: [x] Yes  [] No    Treatment/Activity Tolerance:  [x] Patient able to complete treatment  [] Patient limited by fatigue  [] Patient limited by pain    [] Patient limited by other medical complications  [] Other:         Return to Play: (if applicable)   []  Stage 1: Intro to Strength   []  Stage 2: Return to Run and Strength   []  Stage 3: Return to Jump and Strength   []  Stage 4: Dynamic Strength and Agility   []  Stage 5: Sport Specific Training     []  Ready to Return to Play, Meets All Above Stages   []  Not Ready for Return to Sports   Comments:                               PLAN: Trial D/C to HEP/gym/Pilates; pt to f/u in a couple weeks prn  [] Continue per plan of care [x] Alter current plan (see comments above)  [] Plan of care initiated [] Hold pending MD visit [] Discharge      Electronically signed by:  Mj Healy PT, DPT [id number]    Note: If patient does not return for scheduled/ recommended follow up visits, this note will serve as a discharge from care along with most recent update on progress.

## 2020-05-19 ENCOUNTER — OFFICE VISIT (OUTPATIENT)
Dept: ORTHOPEDIC SURGERY | Age: 55
End: 2020-05-19
Payer: COMMERCIAL

## 2020-05-19 VITALS — RESPIRATION RATE: 17 BRPM | BODY MASS INDEX: 29.41 KG/M2 | WEIGHT: 210.1 LBS | HEIGHT: 71 IN

## 2020-05-19 PROBLEM — S52.124A CLOSED NONDISPLACED FRACTURE OF HEAD OF RIGHT RADIUS: Status: ACTIVE | Noted: 2020-05-19

## 2020-05-19 PROBLEM — S63.501A WRIST SPRAIN, RIGHT, INITIAL ENCOUNTER: Status: ACTIVE | Noted: 2020-05-19

## 2020-05-19 PROBLEM — S63.502A WRIST SPRAIN, LEFT, INITIAL ENCOUNTER: Status: ACTIVE | Noted: 2020-05-19

## 2020-05-19 PROCEDURE — L3908 WHO COCK-UP NONMOLDE PRE OTS: HCPCS | Performed by: ORTHOPAEDIC SURGERY

## 2020-05-19 PROCEDURE — 99203 OFFICE O/P NEW LOW 30 MIN: CPT | Performed by: ORTHOPAEDIC SURGERY

## 2020-05-19 RX ORDER — CICLESONIDE 160 UG/1
AEROSOL, METERED RESPIRATORY (INHALATION)
COMMUNITY
Start: 2020-04-25

## 2020-05-19 RX ORDER — AZELASTINE 1 MG/ML
SPRAY, METERED NASAL
COMMUNITY
Start: 2020-04-25

## 2020-05-19 RX ORDER — IBUPROFEN 800 MG/1
800 TABLET ORAL EVERY 8 HOURS PRN
Qty: 60 TABLET | Refills: 1 | Status: SHIPPED | OUTPATIENT
Start: 2020-05-19

## 2020-05-19 RX ORDER — ALBUTEROL SULFATE 90 UG/1
AEROSOL, METERED RESPIRATORY (INHALATION)
COMMUNITY
Start: 2020-03-10
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
## 2020-06-02 ENCOUNTER — OFFICE VISIT (OUTPATIENT)
Dept: ORTHOPEDIC SURGERY | Age: 55
End: 2020-06-02
Payer: COMMERCIAL

## 2020-06-02 VITALS — RESPIRATION RATE: 16 BRPM | HEIGHT: 71 IN | BODY MASS INDEX: 29.4 KG/M2 | WEIGHT: 210 LBS

## 2020-06-02 PROCEDURE — 99213 OFFICE O/P EST LOW 20 MIN: CPT | Performed by: ORTHOPAEDIC SURGERY

## 2020-07-07 ENCOUNTER — OFFICE VISIT (OUTPATIENT)
Dept: ORTHOPEDIC SURGERY | Age: 55
End: 2020-07-07
Payer: COMMERCIAL

## 2020-07-07 VITALS — RESPIRATION RATE: 15 BRPM | HEIGHT: 71 IN | WEIGHT: 210 LBS | BODY MASS INDEX: 29.4 KG/M2

## 2020-07-07 PROCEDURE — 99213 OFFICE O/P EST LOW 20 MIN: CPT | Performed by: ORTHOPAEDIC SURGERY
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
## 2020-08-25 ENCOUNTER — OFFICE VISIT (OUTPATIENT)
Dept: ORTHOPEDIC SURGERY | Age: 55
End: 2020-08-25
Payer: COMMERCIAL

## 2020-08-25 VITALS — BODY MASS INDEX: 29.4 KG/M2 | HEIGHT: 71 IN | WEIGHT: 210 LBS

## 2020-08-25 PROCEDURE — 99212 OFFICE O/P EST SF 10 MIN: CPT | Performed by: ORTHOPAEDIC SURGERY
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
